# Patient Record
Sex: FEMALE | Race: WHITE | NOT HISPANIC OR LATINO | Employment: PART TIME | URBAN - METROPOLITAN AREA
[De-identification: names, ages, dates, MRNs, and addresses within clinical notes are randomized per-mention and may not be internally consistent; named-entity substitution may affect disease eponyms.]

---

## 2020-07-10 ENCOUNTER — HOSPITAL ENCOUNTER (EMERGENCY)
Facility: HOSPITAL | Age: 25
Discharge: HOME | End: 2020-07-11
Attending: EMERGENCY MEDICINE | Admitting: EMERGENCY MEDICINE

## 2020-07-10 ENCOUNTER — APPOINTMENT (EMERGENCY)
Dept: RADIOLOGY | Facility: HOSPITAL | Age: 25
End: 2020-07-10

## 2020-07-10 VITALS
OXYGEN SATURATION: 100 % | HEART RATE: 88 BPM | SYSTOLIC BLOOD PRESSURE: 104 MMHG | HEIGHT: 59 IN | DIASTOLIC BLOOD PRESSURE: 51 MMHG | TEMPERATURE: 98.5 F | RESPIRATION RATE: 20 BRPM | BODY MASS INDEX: 28.22 KG/M2 | WEIGHT: 140 LBS

## 2020-07-10 DIAGNOSIS — Q04.8 CEREBELLAR TONSILLAR ECTOPIA (HCC): ICD-10-CM

## 2020-07-10 DIAGNOSIS — N39.0 UTI (URINARY TRACT INFECTION): Primary | ICD-10-CM

## 2020-07-10 DIAGNOSIS — H53.8 BLURRED VISION: ICD-10-CM

## 2020-07-10 LAB
ANION GAP SERPL CALCULATED.3IONS-SCNC: 5 MMOL/L (ref 4–13)
BACTERIA UR QL AUTO: ABNORMAL /HPF
BASOPHILS # BLD AUTO: 0.05 THOUSANDS/ΜL (ref 0–0.1)
BASOPHILS NFR BLD AUTO: 1 % (ref 0–1)
BILIRUB UR QL STRIP: NEGATIVE
BUN SERPL-MCNC: 14 MG/DL (ref 5–25)
CALCIUM SERPL-MCNC: 7.8 MG/DL (ref 8.3–10.1)
CHLORIDE SERPL-SCNC: 106 MMOL/L (ref 100–108)
CLARITY UR: ABNORMAL
CO2 SERPL-SCNC: 28 MMOL/L (ref 21–32)
COLOR UR: YELLOW
CREAT SERPL-MCNC: 0.64 MG/DL (ref 0.6–1.3)
EOSINOPHIL # BLD AUTO: 0.03 THOUSAND/ΜL (ref 0–0.61)
EOSINOPHIL NFR BLD AUTO: 0 % (ref 0–6)
ERYTHROCYTE [DISTWIDTH] IN BLOOD BY AUTOMATED COUNT: 13.2 % (ref 11.6–15.1)
EXT PREG TEST URINE: NEGATIVE
EXT. CONTROL ED NAV: NORMAL
GFR SERPL CREATININE-BSD FRML MDRD: 126 ML/MIN/1.73SQ M
GLUCOSE SERPL-MCNC: 124 MG/DL (ref 65–140)
GLUCOSE UR STRIP-MCNC: NEGATIVE MG/DL
HCT VFR BLD AUTO: 38.5 % (ref 34.8–46.1)
HGB BLD-MCNC: 12 G/DL (ref 11.5–15.4)
HGB UR QL STRIP.AUTO: NEGATIVE
HYALINE CASTS #/AREA URNS LPF: ABNORMAL /LPF
IMM GRANULOCYTES # BLD AUTO: 0.02 THOUSAND/UL (ref 0–0.2)
IMM GRANULOCYTES NFR BLD AUTO: 0 % (ref 0–2)
KETONES UR STRIP-MCNC: NEGATIVE MG/DL
LEUKOCYTE ESTERASE UR QL STRIP: ABNORMAL
LYMPHOCYTES # BLD AUTO: 1.19 THOUSANDS/ΜL (ref 0.6–4.47)
LYMPHOCYTES NFR BLD AUTO: 17 % (ref 14–44)
MCH RBC QN AUTO: 28.7 PG (ref 26.8–34.3)
MCHC RBC AUTO-ENTMCNC: 31.2 G/DL (ref 31.4–37.4)
MCV RBC AUTO: 92 FL (ref 82–98)
MONOCYTES # BLD AUTO: 0.36 THOUSAND/ΜL (ref 0.17–1.22)
MONOCYTES NFR BLD AUTO: 5 % (ref 4–12)
NEUTROPHILS # BLD AUTO: 5.38 THOUSANDS/ΜL (ref 1.85–7.62)
NEUTS SEG NFR BLD AUTO: 77 % (ref 43–75)
NITRITE UR QL STRIP: POSITIVE
NON-SQ EPI CELLS URNS QL MICRO: ABNORMAL /HPF
NRBC BLD AUTO-RTO: 0 /100 WBCS
PH UR STRIP.AUTO: 7 [PH]
PLATELET # BLD AUTO: 228 THOUSANDS/UL (ref 149–390)
PMV BLD AUTO: 12.1 FL (ref 8.9–12.7)
POTASSIUM SERPL-SCNC: 3.6 MMOL/L (ref 3.5–5.3)
PROT UR STRIP-MCNC: NEGATIVE MG/DL
RBC # BLD AUTO: 4.18 MILLION/UL (ref 3.81–5.12)
RBC #/AREA URNS AUTO: ABNORMAL /HPF
SODIUM SERPL-SCNC: 139 MMOL/L (ref 136–145)
SP GR UR STRIP.AUTO: 1.02 (ref 1–1.03)
UROBILINOGEN UR QL STRIP.AUTO: 0.2 E.U./DL
WBC # BLD AUTO: 7.03 THOUSAND/UL (ref 4.31–10.16)
WBC #/AREA URNS AUTO: ABNORMAL /HPF

## 2020-07-10 PROCEDURE — 87077 CULTURE AEROBIC IDENTIFY: CPT | Performed by: EMERGENCY MEDICINE

## 2020-07-10 PROCEDURE — 80048 BASIC METABOLIC PNL TOTAL CA: CPT | Performed by: EMERGENCY MEDICINE

## 2020-07-10 PROCEDURE — 85025 COMPLETE CBC W/AUTO DIFF WBC: CPT | Performed by: EMERGENCY MEDICINE

## 2020-07-10 PROCEDURE — 93005 ELECTROCARDIOGRAM TRACING: CPT

## 2020-07-10 PROCEDURE — 70553 MRI BRAIN STEM W/O & W/DYE: CPT

## 2020-07-10 PROCEDURE — 87086 URINE CULTURE/COLONY COUNT: CPT | Performed by: EMERGENCY MEDICINE

## 2020-07-10 PROCEDURE — 99285 EMERGENCY DEPT VISIT HI MDM: CPT | Performed by: EMERGENCY MEDICINE

## 2020-07-10 PROCEDURE — 36415 COLL VENOUS BLD VENIPUNCTURE: CPT | Performed by: EMERGENCY MEDICINE

## 2020-07-10 PROCEDURE — 87186 SC STD MICRODIL/AGAR DIL: CPT | Performed by: EMERGENCY MEDICINE

## 2020-07-10 PROCEDURE — 99284 EMERGENCY DEPT VISIT MOD MDM: CPT

## 2020-07-10 PROCEDURE — 81025 URINE PREGNANCY TEST: CPT | Performed by: EMERGENCY MEDICINE

## 2020-07-10 PROCEDURE — 81001 URINALYSIS AUTO W/SCOPE: CPT | Performed by: EMERGENCY MEDICINE

## 2020-07-10 PROCEDURE — A9585 GADOBUTROL INJECTION: HCPCS | Performed by: EMERGENCY MEDICINE

## 2020-07-10 RX ADMIN — GADOBUTROL 6 ML: 604.72 INJECTION INTRAVENOUS at 22:49

## 2020-07-11 LAB
ATRIAL RATE: 72 BPM
ATRIAL RATE: 87 BPM
P AXIS: 2 DEGREES
P AXIS: 24 DEGREES
PR INTERVAL: 128 MS
PR INTERVAL: 130 MS
QRS AXIS: 70 DEGREES
QRS AXIS: 72 DEGREES
QRSD INTERVAL: 82 MS
QRSD INTERVAL: 86 MS
QT INTERVAL: 352 MS
QT INTERVAL: 366 MS
QTC INTERVAL: 400 MS
QTC INTERVAL: 418 MS
T WAVE AXIS: 44 DEGREES
T WAVE AXIS: 49 DEGREES
VENTRICULAR RATE: 72 BPM
VENTRICULAR RATE: 85 BPM

## 2020-07-11 PROCEDURE — 93010 ELECTROCARDIOGRAM REPORT: CPT | Performed by: INTERNAL MEDICINE

## 2020-07-11 RX ORDER — NITROFURANTOIN 25; 75 MG/1; MG/1
100 CAPSULE ORAL ONCE
Status: COMPLETED | OUTPATIENT
Start: 2020-07-11 | End: 2020-07-11

## 2020-07-11 RX ORDER — NITROFURANTOIN 25; 75 MG/1; MG/1
100 CAPSULE ORAL 2 TIMES DAILY
Qty: 6 CAPSULE | Refills: 0 | Status: SHIPPED | OUTPATIENT
Start: 2020-07-11 | End: 2020-07-14

## 2020-07-11 RX ORDER — NITROFURANTOIN 25; 75 MG/1; MG/1
100 CAPSULE ORAL ONCE
Qty: 10 CAPSULE | Refills: 0 | Status: SHIPPED | OUTPATIENT
Start: 2020-07-11 | End: 2020-07-11 | Stop reason: SDUPTHER

## 2020-07-11 RX ADMIN — NITROFURANTOIN (MONOHYDRATE/MACROCRYSTALS) 100 MG: 75; 25 CAPSULE ORAL at 01:07

## 2020-07-11 NOTE — ED ATTENDING ATTESTATION
7/10/2020  I, Michael Gan MD, saw and evaluated the patient  I have discussed the patient with the resident and agree with the resident's findings, Plan of Care, and MDM as documented in the resident's note, unless otherwise documented below  All available laboratory and imaging studies were reviewed by myself  I was present for key portions of any procedure(s) performed by the resident and I was immediately available to provide assistance  I agree with the current assessment done in the Emergency Department  I have conducted an independent evaluation of this patient      23 y o  female with past medical history of migraine headaches presenting wound intermittent what lower anterior arm numbness and tingling that has been ongoing with the past 2 months, as well as an episode of right eye blurriness that started around 5:00 p m  today and which is now completely resolved  Patient describes the vision change as blurriness or as the looking through tears  It was associated with some pressure/discomfort behind the right eye  There was no concurrent headache, and no numbness or tingling in the arm  No injuries, no head trauma, no car accidents  No prior similar symptoms  No history of multiple sclerosis the family  No nasal congestion or sinusitis  No recent fevers or chills, no chest pain, no shortness of breath  Patient is not on any medications at present  Asymptomatic at present  Concerning left arm numbness and tingling, these episodes, spontaneously, me last minutes to an hour, do not appear to be associated with positioning  On review of systems, patient denies fevers, chills, nausea, vomiting, headache, she does report visual changes as above, which are completely resolved, she reports intermittent numbness and tingling of left arm as above, none at present  No abdominal pain  No diarrhea  Patient does report occasional burning with urination including today    Took pregnancy test yesterday, reports it is negative  Family history positive for Crohn's disease in her mother  Physical Exam  Vitals:    07/10/20 2026 07/10/20 2028   BP: 119/61    TempSrc:  Oral   Pulse: 92    Resp: 18    Patient Position - Orthostatic VS: Sitting    Temp:  98 5 °F (36 9 °C)       Constitutional:  Awake, alert, oriented  No acute distress  HEENT:  Normocephalic, atraumatic  Sclera anicteric, conjunctiva not injected  Moist oral mucosa  Visual Acuity      Most Recent Value   L Pupil Size (mm)  4   R Pupil Size (mm)  4      OS 20/25, OD 20/25, OU 20/20  Uncorrected    Cardiac:  Regular rate and rhythm, no murmurs, rubs, or gallops  2+ radial pulses  Respiratory:  Lungs are clear to auscultation bilaterally, no wheezes or rales  Abdomen:  Nondistended  Bowel sounds present  No tenderness to palpation  No rebound or guarding  Extremities:  No deformities, no edema  Integument:  No rashes or exposed areas, cap refill less than 2 seconds  Neurologic:  Awake, alert, and oriented x3  Pupils 4 mm and reactive bilaterally, no apparent pupillary defect, extraocular eye movements are intact without nystagmus, face is symmetric, uvula elevates midline, tongue protrudes midline, 5/5 strength in bilateral upper and lower extremities  No truncal ataxia  Normal narrow based gait  Sensation to light touch is intact throughout cutaneous nerve distributions of bilateral upper and lower extremities    Psychiatric:  Normal affect    Labs  Labs Reviewed   CBC AND DIFFERENTIAL - Abnormal       Result Value Ref Range Status    WBC 7 03  4 31 - 10 16 Thousand/uL Final    RBC 4 18  3 81 - 5 12 Million/uL Final    Hemoglobin 12 0  11 5 - 15 4 g/dL Final    Hematocrit 38 5  34 8 - 46 1 % Final    MCV 92  82 - 98 fL Final    MCH 28 7  26 8 - 34 3 pg Final    MCHC 31 2 (*) 31 4 - 37 4 g/dL Final    RDW 13 2  11 6 - 15 1 % Final    MPV 12 1  8 9 - 12 7 fL Final    Platelets 871  689 - 390 Thousands/uL Final    nRBC 0  /100 WBCs Final    Neutrophils Relative 77 (*) 43 - 75 % Final    Immat GRANS % 0  0 - 2 % Final    Lymphocytes Relative 17  14 - 44 % Final    Monocytes Relative 5  4 - 12 % Final    Eosinophils Relative 0  0 - 6 % Final    Basophils Relative 1  0 - 1 % Final    Neutrophils Absolute 5 38  1 85 - 7 62 Thousands/µL Final    Immature Grans Absolute 0 02  0 00 - 0 20 Thousand/uL Final    Lymphocytes Absolute 1 19  0 60 - 4 47 Thousands/µL Final    Monocytes Absolute 0 36  0 17 - 1 22 Thousand/µL Final    Eosinophils Absolute 0 03  0 00 - 0 61 Thousand/µL Final    Basophils Absolute 0 05  0 00 - 0 10 Thousands/µL Final   BASIC METABOLIC PANEL - Abnormal    Sodium 139  136 - 145 mmol/L Final    Potassium 3 6  3 5 - 5 3 mmol/L Final    Chloride 106  100 - 108 mmol/L Final    CO2 28  21 - 32 mmol/L Final    ANION GAP 5  4 - 13 mmol/L Final    BUN 14  5 - 25 mg/dL Final    Creatinine 0 64  0 60 - 1 30 mg/dL Final    Comment: Standardized to IDMS reference method    Glucose 124  65 - 140 mg/dL Final    Comment:   If the patient is fasting, the ADA then defines impaired fasting glucose as > 100 mg/dL and diabetes as > or equal to 123 mg/dL  Specimen collection should occur prior to Sulfasalazine administration due to the potential for falsely depressed results  Specimen collection should occur prior to Sulfapyridine administration due to the potential for falsely elevated results      Calcium 7 8 (*) 8 3 - 10 1 mg/dL Final    eGFR 126  ml/min/1 73sq m Final    Narrative:     [place] guidelines for Chronic Kidney Disease (CKD):     Stage 1 with normal or high GFR (GFR > 90 mL/min/1 73 square meters)    Stage 2 Mild CKD (GFR = 60-89 mL/min/1 73 square meters)    Stage 3A Moderate CKD (GFR = 45-59 mL/min/1 73 square meters)    Stage 3B Moderate CKD (GFR = 30-44 mL/min/1 73 square meters)    Stage 4 Severe CKD (GFR = 15-29 mL/min/1 73 square meters)    Stage 5 End Stage CKD (GFR <15 mL/min/1 73 square meters)  Note: GFR calculation is accurate only with a steady state creatinine   UA W REFLEX TO MICROSCOPIC WITH REFLEX TO CULTURE - Abnormal    Color, UA Yellow   Final    Clarity, UA Cloudy   Final    Specific Gravity, UA 1 022  1 003 - 1 030 Final    pH, UA 7 0  4 5, 5 0, 5 5, 6 0, 6 5, 7 0, 7 5, 8 0 Final    Leukocytes, UA Small (*) Negative Final    Nitrite, UA Positive (*) Negative Final    Protein, UA Negative  Negative mg/dl Final    Glucose, UA Negative  Negative mg/dl Final    Ketones, UA Negative  Negative mg/dl Final    Urobilinogen, UA 0 2  0 2, 1 0 E U /dl E U /dl Final    Bilirubin, UA Negative  Negative Final    Blood, UA Negative  Negative Final   URINE MICROSCOPIC - Abnormal    RBC, UA None Seen  None Seen, 0-5 /hpf Final    WBC, UA 20-30 (*) None Seen, 0-5, 5-55, 5-65 /hpf Final    Epithelial Cells Moderate (*) None Seen, Occasional /hpf Final    Bacteria, UA Innumerable (*) None Seen, Occasional /hpf Final    Hyaline Casts, UA None Seen  None Seen /lpf Final   POCT PREGNANCY, URINE - Normal    EXT PREG TEST UR (Ref: Negative) negative   Final    Control valid   Final       Tests  MRI brain MS wo and w contrast   Final Result   Addendum 1 of 1   ADDENDUM:      Typographical error in impression #1 corrected as follows:      Few nonspecific subcortical white matter lesions in the left hemisphere    UNLIKELY to represent a demyelinating process  Possible sequela of    migraine appropriate clinical context  Final          Procedures  Procedures        ED Course  Medications   gadobutrol injection (MULTI-DOSE) SOLN 6 mL (6 mL Intravenous Given 7/10/20 0318)   nitrofurantoin (MACROBID) extended-release capsule 100 mg (100 mg Oral Given 7/11/20 0107)       82-year-old female presenting with a transient episode of right eye blurring as well as with intermittent left upper extremity numbness and tingling  Vital signs reviewed, afebrile and within normal limits    Differential diagnosis includes benign etiologies such as mild conjunctival irritation such as due to an allergen, with etiologies for arm numbness and tingling due to position, such as compression of ulnar nerve versus another etiology (does not appear to be so by history)  Unfortunately, differential diagnosis also includes a central process such as multiple sclerosis, optic neuritis, nerve compression at cervical spine level, versus another etiology  At present, patient is completely asymptomatic  BMP is with mild hypocalcemia with calcium of 7 8, CBC is within normal limits  Given report of burning with urination, UA obtained, has moderate squamous epithelial cells suggesting inappropriately collected temple, but positive for nitrites and white blood cells with innumerable bacteria  Given that patient is having symptoms, we will treat for uncomplicated cystitis with a course of Macrobid and await culture results  MRI of brain with without contrast obtained, no demyelinating lesions  Initial report did suggest these, however, corrected report is as above  Nonetheless, we did discuss the case with neurology and patient will be able to follow up with Neurology on outpatient basis  Patient deemed safe to discharge home on a course of Macrobid and with instructions to follow up with PCP and with Neurology  Return to emergency department with new or worsening symptoms      Clinical Impression  Final diagnoses:   UTI (urinary tract infection)   Blurred vision   Cerebellar tonsillar ectopia [facility])       Discharge Medication List as of 7/11/2020  1:03 AM      CONTINUE these medications which have CHANGED    Details   nitrofurantoin (MACROBID) 100 mg capsule Take 1 capsule (100 mg total) by mouth 2 (two) times a day for 3 days, Starting Sat 7/11/2020, Until Tue 7/14/2020, Print

## 2020-07-11 NOTE — ED NOTES
Dr Caroline Mueller at bedside for patient pedro Brush Mercer, [address] [facility]  07/10/20 2030

## 2020-07-11 NOTE — ED PROVIDER NOTES
History  Chief Complaint   Patient presents with   Bharat Hickman     as per pt about an hour ago began having "right sided blindness" pt also states a few days ago had onset of abdominal pain and left sided arm numbness     23year old female no PMH of headches presenting emergency department with paroxysmal left arm numbness and change in her right vision today  Patient describes the right vision changes blurriness/"the feeling like when you look through tears"  No headached today  Family history of crohn's  Patient denies personal hx of crohn't though it is on her chart  Patient notes slight pain in right eye since an hour ago  Patient also notes left arm numbness/ paresthesias paroxysmally for the last 2 months  No headaches  No previous medical history  On no medications  No symptoms after hot baths  No symptoms after compression of the neck or bending of the spine  Patient does not have an ophthalmologist   Patient denies any deficits in her vision to the color red  Eye Problem   Location:  Right eye  Quality: blurry vision like looking through tears  Onset quality:  Sudden  Duration:  1 hour  Timing:  Constant  Progression:  Worsening  Chronicity:  New  Relieved by:  Nothing  Worsened by:  Nothing  Ineffective treatments:  None tried  Associated symptoms: blurred vision and numbness    Associated symptoms: no double vision, no headaches and no photophobia        None       Past Medical History:   Diagnosis Date    Crohn's disease ([facility] [address] )     Gastritis        Past Surgical History:   Procedure Laterality Date    APPENDECTOMY      TYMPANOSTOMY TUBE PLACEMENT         History reviewed  No pertinent family history  I have reviewed and agree with the history as documented      E-Cigarette/Vaping    E-Cigarette Use Never User      E-Cigarette/Vaping Substances     Social History     Tobacco Use    Smoking status: Never Smoker    Smokeless tobacco: Never Used   Substance Use Topics  Alcohol use: Not Currently    Drug use: Not Currently        Review of Systems   HENT:        Eye pain/ blurring     Eyes: Positive for blurred vision  Negative for double vision and photophobia  Neurological: Positive for numbness  Negative for headaches  Physical Exam  ED Triage Vitals   Temperature Pulse Respirations Blood Pressure SpO2   07/10/20 2028 07/10/20 2026 07/10/20 2026 07/10/20 2026 07/10/20 2026   98 5 °F (36 9 °C) 92 18 119/61 98 %      Temp Source Heart Rate Source Patient Position - Orthostatic VS BP Location FiO2 (%)   07/10/20 2028 07/10/20 2026 07/10/20 2026 07/10/20 2026 --   Oral Monitor Sitting Right arm       Pain Score       --                    Orthostatic Vital Signs  Vitals:    07/10/20 2026 07/10/20 2340   BP: 119/61 104/51   Pulse: 92 88   Patient Position - Orthostatic VS: Sitting        Physical Exam   Constitutional: She appears well-developed and well-nourished  No distress  HENT:   Head: Normocephalic and atraumatic  Right Ear: External ear normal    Left Ear: External ear normal    Eyes: Conjunctivae and EOM are normal    Neck: No JVD present  No tracheal deviation present  Cardiovascular: Normal rate, regular rhythm, normal heart sounds and intact distal pulses  No murmur heard  Pulmonary/Chest: Breath sounds normal  No stridor  No respiratory distress  She has no wheezes  She has no rales  Abdominal: Soft  Bowel sounds are normal  She exhibits no distension and no mass  There is no tenderness  There is no rebound and no guarding  Genitourinary:   Genitourinary Comments: Deferred   Musculoskeletal: She exhibits no edema, tenderness or deformity  Neurological: She exhibits normal muscle tone  Coordination normal    Cranial nerves 2-12 intact bilaterally  20/25 OS/OD, 20/20 OU  Muscular strength 5/5 in the upper lower extremities bilaterally  Sensation intact light touch in the upper lower extremities bilaterally  Ambulates without difficulty  Negative pronator drift  Negative point-to-point  Negative Romberg  No aferrent pupillary defect  Red saturation equal OS/ OD   Skin: Skin is warm and dry  Capillary refill takes less than 2 seconds  No rash noted  She is not diaphoretic  No erythema  No pallor  Psychiatric: She has a normal mood and affect  Her behavior is normal  Judgment and thought content normal    Nursing note and vitals reviewed  ED Medications  Medications   gadobutrol injection (MULTI-DOSE) SOLN 6 mL (6 mL Intravenous Given 7/10/20 2249)   nitrofurantoin (MACROBID) extended-release capsule 100 mg (100 mg Oral Given 7/11/20 0107)       Diagnostic Studies  Results Reviewed     Procedure Component Value Units Date/Time    Urine Microscopic [[id number]]  (Abnormal) Collected:  07/10/20 2222    Lab Status:  Final result Specimen:  Urine, Clean Catch Updated:  07/10/20 2250     RBC, UA None Seen /hpf      WBC, UA 20-30 /hpf      Epithelial Cells Moderate /hpf      Bacteria, UA Innumerable /hpf      Hyaline Casts, UA None Seen /lpf     Urine culture [[id number]] Collected:  07/10/20 2222    Lab Status:   In process Specimen:  Urine, Clean Catch Updated:  07/10/20 2250    UA w Reflex to Microscopic w Reflex to Culture [[id number]]  (Abnormal) Collected:  07/10/20 2222    Lab Status:  Final result Specimen:  Urine, Clean Catch Updated:  07/10/20 2248     Color, UA Yellow     Clarity, UA Cloudy     Specific Gravity, UA 1 022     pH, UA 7 0     Leukocytes, UA Small     Nitrite, UA Positive     Protein, UA Negative mg/dl      Glucose, UA Negative mg/dl      Ketones, UA Negative mg/dl      Urobilinogen, UA 0 2 E U /dl      Bilirubin, UA Negative     Blood, UA Negative    Basic metabolic panel [[id number]]  (Abnormal) Collected:  07/10/20 2124    Lab Status:  Final result Specimen:  Blood from Arm, Left Updated:  07/10/20 2141     Sodium 139 mmol/L      Potassium 3 6 mmol/L      Chloride 106 mmol/L      CO2 28 mmol/L      ANION GAP 5 mmol/L BUN 14 mg/dL      Creatinine 0 64 mg/dL      Glucose 124 mg/dL      Calcium 7 8 mg/dL      eGFR 126 ml/min/1 73sq m     Narrative:       [place] guidelines for Chronic Kidney Disease (CKD):     Stage 1 with normal or high GFR (GFR > 90 mL/min/1 73 square meters)    Stage 2 Mild CKD (GFR = 60-89 mL/min/1 73 square meters)    Stage 3A Moderate CKD (GFR = 45-59 mL/min/1 73 square meters)    Stage 3B Moderate CKD (GFR = 30-44 mL/min/1 73 square meters)    Stage 4 Severe CKD (GFR = 15-29 mL/min/1 73 square meters)    Stage 5 End Stage CKD (GFR <15 mL/min/1 73 square meters)  Note: GFR calculation is accurate only with a steady state creatinine    POCT pregnancy, urine [[id number]]  (Normal) Resulted:  07/10/20 2133    Lab Status:  Final result Updated:  07/10/20 2133     EXT PREG TEST UR (Ref: Negative) negative     Control valid    CBC and differential [[id number]]  (Abnormal) Collected:  07/10/20 2124    Lab Status:  Final result Specimen:  Blood from Arm, Left Updated:  07/10/20 2131     WBC 7 03 Thousand/uL      RBC 4 18 Million/uL      Hemoglobin 12 0 g/dL      Hematocrit 38 5 %      MCV 92 fL      MCH 28 7 pg      MCHC 31 2 g/dL      RDW 13 2 %      MPV 12 1 fL      Platelets 711 Thousands/uL      nRBC 0 /100 WBCs      Neutrophils Relative 77 %      Immat GRANS % 0 %      Lymphocytes Relative 17 %      Monocytes Relative 5 %      Eosinophils Relative 0 %      Basophils Relative 1 %      Neutrophils Absolute 5 38 Thousands/µL      Immature Grans Absolute 0 02 Thousand/uL      Lymphocytes Absolute 1 19 Thousands/µL      Monocytes Absolute 0 36 Thousand/µL      Eosinophils Absolute 0 03 Thousand/µL      Basophils Absolute 0 05 Thousands/µL                  MRI brain MS wo and w contrast   Final Result by  (07/11 9443)   Addendum 1 of 1 by Disha Rasmussen MD (07/11 4192)   ADDENDUM:      Typographical error in impression #1 corrected as follows:      Few nonspecific subcortical white matter lesions in the left hemisphere    UNLIKELY to represent a demyelinating process  Possible sequela of    migraine appropriate clinical context  Final            Procedures  Procedures      ED Course  ED Course as of Jul 11 0123   Sat Jul 11, 2020   7085 Spoke with Dr Aliyah Grullon, on-call neurologist   He recommended checking for red vision deficits in the affected eye  No deficits    Recommended not believe this is a demyelinating syndrome  Minimal suspicion for an MS  Symptoms not necessarily consistent with optic neuritis  Recommended deferring steroids at this point  Outpatient follow-up  Out patient ophthalmology follow-up as well  US AUDIT      Most Recent Value   Initial Alcohol Screen: US AUDIT-C    1  How often do you have a drink containing alcohol?  0 Filed at: 07/10/2020 2020   2  How many drinks containing alcohol do you have on a typical day you are drinking? 0 Filed at: 07/10/2020 2020   3a  Male UNDER 65: How often do you have five or more drinks on one occasion? 0 Filed at: 07/10/2020 2020   3b  FEMALE Any Age, or MALE 65+: How often do you have 4 or more drinks on one occassion? 0 Filed at: 07/10/2020 2020   Audit-C Score  0 Filed at: 07/10/2020 2020                  BENJAMIN/DAST-10      Most Recent Value   How many times in the past year have you    Used an illegal drug or used a prescription medication for non-medical reasons? 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[facility]): new and requires workup  UTI (urinary tract infection): new and requires workup  Diagnosis management comments: Differential diagnosis:  Complex headache, MS, optic neuritis, uveitis less likely, less likely retinal tear, less likely stroke  Patient's visual symptoms held concluded by the time the attending visit the patient      Called for emergent MRI to rule out new onset MS  MRI showed cerebellar ectopy, a couple spots of what could be demyelinating disease but no significant disease  Patient told of the findings and recommended follow-up with Neurosurgery regarding the cerebellar tonsillar ectopy  Symptoms not consistent with retinal tear, vitreous hemorrhage, floaters  Spoke with on-call neurologist recommended follow-up outpatient with Neurology  Holding steroids at this point  Outpatient Ophthalmology  Workup otherwise without acute findings other than possible urinary tract infection, though it was a dirty catch  Patient be treated with Darius Pearl  Patient told of all findings including cerebellar findings  Patient expressed understanding with the planned followups  Patient come back to the emergency department if she has any new neurological deficits  Patient expressed understanding with this             Amount and/or Complexity of Data Reviewed  Clinical lab tests: ordered and reviewed  Tests in the radiology section of CPT®: ordered and reviewed  Decide to obtain previous medical records or to obtain history from someone other than the patient: yes  Review and summarize past medical records: yes  Independent visualization of images, tracings, or specimens: yes    Risk of Complications, Morbidity, and/or Mortality  Presenting problems: high  Diagnostic procedures: high  Management options: high          Disposition  Final diagnoses:   UTI (urinary tract infection)   Blurred vision   Cerebellar tonsillar ectopia ([address] )     Time reflects when diagnosis was documented in both MDM as applicable and the Disposition within this note     Time User Action Codes Description Comment    7/11/2020 12:33 AM Mary Laforest Add [N39 0] UTI (urinary tract infection)     7/11/2020 12:36 AM Mary Laud Add [H53 8] Blurred vision     7/11/2020 12:56 AM Sissy Chatman [employer] Add [Q04 8] Cerebellar tonsillar ectopia [facility])       ED Disposition     ED Disposition Condition Date/Time Comment    Discharge Stable Sat Jul 11, 2020 12:33 AM [address] discharge to home/self care  Follow-up Information     Follow up With Specialties Details Why Contact Info Additional Willa Escalante Neurology [facility] Neurology Schedule an appointment as soon as possible for a visit   [facility] [address] [place] [address] Neurology [facility], [address] [place] [address], [id number] [place] [address] Neurosurgery  to evaluation your cerebellar tonsillar ectopia [address] [facility] [address] [facility] [id number]  [facility] 9, [address], [address]    Roxana Nj MD Ophthalmology Call today For re-evaluation as soon as possible [address]  [id number] [address]             Current Discharge Medication List      START taking these medications    Details   nitrofurantoin (MACROBID) 100 mg capsule Take 1 capsule (100 mg total) by mouth 2 (two) times a day for 3 days  Qty: 6 capsule, Refills: 0    Associated Diagnoses: UTI (urinary tract infection)           No discharge procedures on file  PDMP Review     None           ED Provider  Attending physically available and evaluated [address]  I managed the patient along with the ED Attending      Electronically Signed by         Chepe Cadena DO  07/11/20 0724

## 2020-07-11 NOTE — DISCHARGE INSTRUCTIONS
Call neurology to follow-up as soon as possible  Call Ophthalmology to follow up as well  Call Neurosurgery to follow up on your imaging finding (cerebellar tonisllar ectopia)  Come back to the emergency department if you have new neurological deficits such as new change in vision, numbness, tingling, focal weakness

## 2020-07-13 LAB — BACTERIA UR CULT: ABNORMAL

## 2021-08-21 ENCOUNTER — HOSPITAL ENCOUNTER (EMERGENCY)
Facility: HOSPITAL | Age: 26
Discharge: HOME | End: 2021-08-21
Attending: EMERGENCY MEDICINE | Admitting: EMERGENCY MEDICINE
Payer: MEDICARE

## 2021-08-21 VITALS
RESPIRATION RATE: 16 BRPM | DIASTOLIC BLOOD PRESSURE: 57 MMHG | HEART RATE: 87 BPM | TEMPERATURE: 98.5 F | OXYGEN SATURATION: 100 % | SYSTOLIC BLOOD PRESSURE: 119 MMHG

## 2021-08-21 DIAGNOSIS — S06.0X0A CONCUSSION WITHOUT LOSS OF CONSCIOUSNESS, INITIAL ENCOUNTER: Primary | ICD-10-CM

## 2021-08-21 PROCEDURE — 99284 EMERGENCY DEPT VISIT MOD MDM: CPT | Performed by: EMERGENCY MEDICINE

## 2021-08-21 PROCEDURE — 99282 EMERGENCY DEPT VISIT SF MDM: CPT

## 2021-08-21 NOTE — Clinical Note
Douglas Watkins was seen and treated in our emergency department on 8/21/2021  Diagnosis:     Lizandro Stone  may return to work on return date  She may return on this date: 08/23/2021         If you have any questions or concerns, please don't hesitate to call        Jose Rubio DO    ______________________________           _______________          _______________  Hospital Representative                              Date                                Time

## 2021-08-22 NOTE — ED PROVIDER NOTES
History  Chief Complaint   Patient presents with    Head Injury     Pt presents to the ED stating she was at work and stood up and hit her head off a counter top  Complains of dizziness  No LOC, no thinners  28 weeks pregnant  Patient is a 27year old female, who is 28 weeks pregnant, presenting to the ED for evaluation of head injury  Patient states that she was reaching down to get something at work at 1pm this afternoon, and when she stood up, she hit the back of her head on the corner of a cabinet  Patient denies LOC, and denies open wound to the scalp  She was able to complete her shift at work  Throughout the day, she developed some mild lightheadedness and some slight nausea  She wanted to come to the ED for evaluation "just to be safe", given her pregnancy  Patient denies dizziness, vomiting, neck pain, paresthesias, focal weakness, speech changes, headache  None       Past Medical History:   Diagnosis Date    Crohn's disease ([facility] [address] )     Gastritis        Past Surgical History:   Procedure Laterality Date    APPENDECTOMY      TYMPANOSTOMY TUBE PLACEMENT         History reviewed  No pertinent family history  I have reviewed and agree with the history as documented  E-Cigarette/Vaping    E-Cigarette Use Never User      E-Cigarette/Vaping Substances     Social History     Tobacco Use    Smoking status: Never Smoker    Smokeless tobacco: Never Used   Vaping Use    Vaping Use: Never used   Substance Use Topics    Alcohol use: Not Currently    Drug use: Not Currently        Review of Systems   Constitutional: Negative for chills and fever  HENT: Negative for ear pain and sore throat  Eyes: Negative for pain and visual disturbance  Respiratory: Negative for cough and shortness of breath  Cardiovascular: Negative for chest pain and palpitations  Gastrointestinal: Negative for abdominal pain and vomiting  Genitourinary: Negative for dysuria and hematuria     Musculoskeletal: Negative for arthralgias and back pain  Skin: Negative for color change and rash  Neurological: Negative for seizures and syncope  All other systems reviewed and are negative  Physical Exam  ED Triage Vitals [08/21/21 2143]   Temperature Pulse Respirations Blood Pressure SpO2   98 5 °F (36 9 °C) 87 16 119/57 100 %      Temp Source Heart Rate Source Patient Position - Orthostatic VS BP Location FiO2 (%)   Oral Monitor Lying Right arm --      Pain Score       --             Orthostatic Vital Signs  Vitals:    08/21/21 2143   BP: 119/57   Pulse: 87   Patient Position - Orthostatic VS: Lying       Physical Exam  Vitals and nursing note reviewed  Constitutional:       General: She is not in acute distress  Appearance: She is well-developed  HENT:      Head: Normocephalic and atraumatic  Comments: There are no lacerations or hematomas noted on the scalp  Right Ear: Tympanic membrane and external ear normal       Left Ear: Tympanic membrane and external ear normal       Nose: Nose normal       Mouth/Throat:      Mouth: Mucous membranes are moist       Pharynx: Oropharynx is clear  Eyes:      Extraocular Movements: Extraocular movements intact  Conjunctiva/sclera: Conjunctivae normal       Pupils: Pupils are equal, round, and reactive to light  Cardiovascular:      Rate and Rhythm: Normal rate and regular rhythm  Pulses: Normal pulses  Heart sounds: Normal heart sounds  No murmur heard  Pulmonary:      Effort: Pulmonary effort is normal  No respiratory distress  Breath sounds: Normal breath sounds  Abdominal:      General: Bowel sounds are normal       Palpations: Abdomen is soft  Tenderness: There is no abdominal tenderness  Comments: Gravid   Musculoskeletal:         General: No swelling or tenderness  Normal range of motion  Cervical back: Neck supple  No tenderness  Right lower leg: No edema  Left lower leg: No edema     Skin:     General: Skin is warm and dry  Capillary Refill: Capillary refill takes less than 2 seconds  Neurological:      General: No focal deficit present  Mental Status: She is alert and oriented to person, place, and time  GCS: GCS eye subscore is 4  GCS verbal subscore is 5  GCS motor subscore is 6  Cranial Nerves: No cranial nerve deficit  Sensory: Sensation is intact  No sensory deficit  Motor: No weakness  Coordination: Coordination normal  Finger-Nose-Finger Test normal       Gait: Gait is intact  Psychiatric:         Mood and Affect: Mood normal          ED Medications  Medications - No data to display    Diagnostic Studies  Results Reviewed     None                 No orders to display         Procedures  Procedures      ED Course  ED Course as of Aug 21 2230   Sat Aug 21, 2021   2228 Given low mechanism of injury patient remains asymptomatic, CT scan of the head required  Her symptoms are most likely due to mild concussion  I discussed that she should avoid excessive screen time, avoid any strenuous activity, get plenty of rest   If she develops worsening headache, vomiting, focal extremity weakness, dizziness, speech changes, she should return to the ED immediately for evaluation  Patient is agreeable with this plan  Work note given for tomorrow  MDM    Disposition  Final diagnoses:   Concussion without loss of consciousness, initial encounter     Time reflects when diagnosis was documented in both MDM as applicable and the Disposition within this note     Time User Action Codes Description Comment    8/21/2021  9:54 PM Jose Valdes Add [[id number]] Concussion without loss of consciousness, initial encounter       ED Disposition     ED Disposition Condition Date/Time Comment    Discharge Stable Sat Aug 21, 2021  9:54 PM [address] discharge to home/self care              Follow-up Information     Follow up With Specialties Details Why [address], [address] Mike Lino Nurse Practitioner Call in 2 days If symptoms worsen [id number] [facility] [id number] [phone]            Patient's Medications    No medications on file     No discharge procedures on file  PDMP Review     None           ED Provider  Attending physically available and evaluated [facility]  I managed the patient along with the ED Attending      Electronically Signed by         Curry Tolbert DO  08/21/21 0262

## 2021-08-22 NOTE — DISCHARGE INSTRUCTIONS
Thank you for coming to the Emergency Department  At this time, there is no indication for imaging of the brain, given low mechanism of injury and no loss of consciousness  Your symptoms are likely due to a very mild concussion  I advise avoiding any strenuous activity over the next 2 days, avoid excessive screen time,and get plenty of rest  If you develop severe headache, nausea, vomiting, focal weakness, speech changes, please return to the ED immediately for evaluation  Otherwise, you can follow up with your PMD in 1-2 days  Please take Tylenol for any headache, as that is safe in pregnancy

## 2021-08-23 NOTE — ED ATTENDING ATTESTATION
8/21/2021  IShari, DO, saw and evaluated the patient  I have discussed the patient with the resident/non-physician practitioner and agree with the resident's/non-physician practitioner's findings, Plan of Care, and MDM as documented in the resident's/non-physician practitioner's note, except where noted  All available labs and Radiology studies were reviewed  I was present for key portions of any procedure(s) performed by the resident/non-physician practitioner and I was immediately available to provide assistance  At this point I agree with the current assessment done in the Emergency Department  I have conducted an independent evaluation of this patient a history and physical is as follows:      27 yof with head injury  Was at work, struck head on shelf  No loc  Concerned about concussion given she is 28 weeks pregannt   She looks well, normaal exam, dc  ED Course         Critical Care Time  Procedures

## 2024-10-04 ENCOUNTER — APPOINTMENT (OUTPATIENT)
Dept: URGENT CARE | Age: 29
End: 2024-10-04

## 2024-10-04 ENCOUNTER — LAB (OUTPATIENT)
Dept: LAB | Age: 29
End: 2024-10-04

## 2024-10-04 DIAGNOSIS — Z02.1 NEED FOR HISTORY AND PHYSICAL EXAMINATION FOR EMPLOYMENT: ICD-10-CM

## 2024-10-04 PROCEDURE — 86480 TB TEST CELL IMMUN MEASURE: CPT

## 2024-10-05 LAB
GAMMA INTERFERON BACKGROUND BLD IA-ACNC: 0.01 IU/ML
M TB IFN-G BLD-IMP: NEGATIVE
M TB IFN-G CD4+ BCKGRND COR BLD-ACNC: -0.01 IU/ML
M TB IFN-G CD4+ BCKGRND COR BLD-ACNC: -0.01 IU/ML
MITOGEN IGNF BCKGRD COR BLD-ACNC: 9.99 IU/ML